# Patient Record
Sex: MALE | Race: WHITE | NOT HISPANIC OR LATINO | Employment: FULL TIME | URBAN - METROPOLITAN AREA
[De-identification: names, ages, dates, MRNs, and addresses within clinical notes are randomized per-mention and may not be internally consistent; named-entity substitution may affect disease eponyms.]

---

## 2022-03-18 ENCOUNTER — OFFICE VISIT (OUTPATIENT)
Dept: FAMILY MEDICINE CLINIC | Facility: CLINIC | Age: 47
End: 2022-03-18

## 2022-03-18 VITALS
DIASTOLIC BLOOD PRESSURE: 82 MMHG | HEIGHT: 74 IN | BODY MASS INDEX: 27.26 KG/M2 | WEIGHT: 212.4 LBS | TEMPERATURE: 98.2 F | HEART RATE: 66 BPM | RESPIRATION RATE: 14 BRPM | OXYGEN SATURATION: 99 % | SYSTOLIC BLOOD PRESSURE: 116 MMHG

## 2022-03-18 DIAGNOSIS — N52.8 OTHER MALE ERECTILE DYSFUNCTION: Primary | ICD-10-CM

## 2022-03-18 DIAGNOSIS — K21.9 GASTROESOPHAGEAL REFLUX DISEASE WITHOUT ESOPHAGITIS: ICD-10-CM

## 2022-03-18 DIAGNOSIS — R79.89 LOW TESTOSTERONE: ICD-10-CM

## 2022-03-18 PROCEDURE — 99214 OFFICE O/P EST MOD 30 MIN: CPT | Performed by: STUDENT IN AN ORGANIZED HEALTH CARE EDUCATION/TRAINING PROGRAM

## 2022-03-18 RX ORDER — TADALAFIL 10 MG/1
5 TABLET ORAL DAILY PRN
Qty: 60 TABLET | Refills: 2 | Status: SHIPPED | OUTPATIENT
Start: 2022-03-18 | End: 2023-01-19 | Stop reason: SDUPTHER

## 2022-03-18 RX ORDER — DOXYCYCLINE HYCLATE 20 MG
TABLET ORAL
COMMUNITY
End: 2022-05-13

## 2022-03-18 NOTE — PROGRESS NOTES
Established Patient Office Visit      Subjective      Chief Complaint:  [facility] (Patient here to est pcp)      History of Present Illness: Dmitri Quispe is a 46 y.o. male who presents to [facility].  He previously saw Dr. Schwartz as [facility] who was previously prescribing his testosterone.  He has previously had a colonoscopy at [facility] and will request these records.    Reviewed urgent care note from 3/16.  He had some chest wall discomfort and epigastric pain and was started on omeprazole.    Paitent had one episode when he drank coffee and then had a discomfort at about he level of xyphoid.     Patient was on testosterone 100 mg weekly.  He has not taken testosterone in about 4 months.    Past Medical History:   Diagnosis Date   • Arthritis    • Gastroesophageal reflux disease without esophagitis    • Heartburn    • Low testosterone    • Other male erectile dysfunction        Patient Active Problem List   Diagnosis   • Low testosterone   • Other male erectile dysfunction   • Gastroesophageal reflux disease without esophagitis         Current Outpatient Medications:   •  Benzoyl Peroxide 10 % liquid, benzoyl peroxide 10 % topical cleanser  Wash onto the affected areas of acne on the back, chest, shoulders and rinse daily, as needed., Disp: , Rfl:   •  cholecalciferol (VITAMIN D3) 25 MCG (1000 UT) tablet, Take 1,000 Units by mouth Daily., Disp: , Rfl:   •  doxycycline (PERIOSTAT) 20 MG tablet, doxycycline hyclate 20 mg tablet  Take 1 tablet twice a day, as needed, for acne breakouts., Disp: , Rfl:   •  omeprazole (priLOSEC) 20 MG capsule, Take 1 capsule by mouth 2 (Two) Times a Day., Disp: 60 capsule, Rfl: 0  •  VITAMIN K PO, Take  by mouth., Disp: , Rfl:   •  tadalafil (Cialis) 10 MG tablet, Take 0.5 tablets by mouth Daily As Needed for Erectile Dysfunction., Disp: 60 tablet, Rfl: 2  •  Testosterone Cypionate 200 MG/ML kit, testosterone, Disp: , Rfl:       Objective     Physical  "Exam:   Vital Signs:   /82 (BP Location: Left arm, Patient Position: Sitting, Cuff Size: Adult)   Pulse 66   Temp 98.2 °F (36.8 °C) (Temporal)   Resp 14   Ht 188 cm (74\")   Wt 96.3 kg (212 lb 6.4 oz)   SpO2 99%   BMI 27.27 kg/m²      Physical Exam  Constitutional:       General: He is not in acute distress.     Appearance: He is not ill-appearing.   Cardiovascular:      Rate and Rhythm: Normal rate and regular rhythm.  No murmur  Pulmonary:      Effort: Pulmonary effort is normal.      Breath sounds: Normal breath sounds.   Neurological:      Mental Status: He is alert.   Psychiatric:         Thought Content: Thought content normal.          Assessment / Plan      Assessment/Plan:   Diagnoses and all orders for this visit:    1. Other male erectile dysfunction (Primary)  -     tadalafil (Cialis) 10 MG tablet; Take 0.5 tablets by mouth Daily As Needed for Erectile Dysfunction.  Dispense: 60 tablet; Refill: 2    2. Low testosterone        -     We will await labs from [facility].  We had a discussion about possible treatment and treatment options and plan moving forward for low testosterone.  Will discuss further follow-up appointment.       3. Gastroesophageal reflux disease without esophagitis  -Short episode of epigastric pain that seems like could been related to GERD.  -Reasonable to continue trial of omeprazole 20 mg twice daily for 2 months.  GERD diet given.    Follow Up:   Return in about 6 weeks (around 4/29/2022) for Wellness visit.      MDM: Multiple chronic problems, prescription drug management    Matteo Constantino MD  [facility]    Addendum 3/28/2022  Review of records shows testosterone of just above 200.  LH within normal limits.  CBC within normal limits.  PSA 0.9.    Matteo Constantino MD  [facility]      "

## 2022-03-18 NOTE — PATIENT INSTRUCTIONS
Sign records. [facility] colonoscopy.     Bring last labs.     Sign records from Dr. Schwartz ([facility]) last 2 years of records

## 2022-03-19 PROBLEM — K21.9 GASTROESOPHAGEAL REFLUX DISEASE WITHOUT ESOPHAGITIS: Status: ACTIVE | Noted: 2022-03-19

## 2022-04-01 ENCOUNTER — OFFICE VISIT (OUTPATIENT)
Dept: FAMILY MEDICINE CLINIC | Facility: CLINIC | Age: 47
End: 2022-04-01

## 2022-04-01 VITALS
HEART RATE: 58 BPM | OXYGEN SATURATION: 97 % | HEIGHT: 74 IN | BODY MASS INDEX: 27.08 KG/M2 | WEIGHT: 211 LBS | TEMPERATURE: 98.6 F | DIASTOLIC BLOOD PRESSURE: 74 MMHG | SYSTOLIC BLOOD PRESSURE: 118 MMHG

## 2022-04-01 DIAGNOSIS — E29.1 HYPOGONADISM IN MALE: ICD-10-CM

## 2022-04-01 DIAGNOSIS — N52.8 OTHER MALE ERECTILE DYSFUNCTION: ICD-10-CM

## 2022-04-01 DIAGNOSIS — K63.5 SERRATED POLYP OF COLON: ICD-10-CM

## 2022-04-01 DIAGNOSIS — Z12.11 SCREENING FOR COLON CANCER: ICD-10-CM

## 2022-04-01 PROCEDURE — 99214 OFFICE O/P EST MOD 30 MIN: CPT | Performed by: STUDENT IN AN ORGANIZED HEALTH CARE EDUCATION/TRAINING PROGRAM

## 2022-04-01 RX ORDER — TESTOSTERONE GEL, 1% 10 MG/G
50 GEL TRANSDERMAL DAILY
Qty: 150 G | Refills: 1 | Status: SHIPPED | OUTPATIENT
Start: 2022-04-01 | End: 2022-04-06

## 2022-04-01 NOTE — PROGRESS NOTES
"  Established Patient Office Visit      Subjective      Chief Complaint:  testosterone (Low level )      History of Present Illness: Dmitri Quispe is a 46 y.o. male who presents for low testosterone and decreased libido and erectile dysfunction    On scanned labs testosterone with morning blood draw was 203.  LH was 2.7.  TSH was 2.5.  PSA was 0.9.  CBC within normal limits.  Renal panel within normal limits.    Patient has decreased libido and erectile dysfunction.  He was previously on injectable therapy with Dr. Schwartz.    No family history of prostate cancer.    Past Medical History:   Diagnosis Date   • Arthritis    • Gastroesophageal reflux disease without esophagitis    • Heartburn    • Low testosterone    • Other male erectile dysfunction        Patient Active Problem List   Diagnosis   • Low testosterone   • Other male erectile dysfunction   • Gastroesophageal reflux disease without esophagitis   • Serrated polyp of colon         Current Outpatient Medications:   •  cholecalciferol (VITAMIN D3) 25 MCG (1000 UT) tablet, Take 1,000 Units by mouth Daily., Disp: , Rfl:   •  doxycycline (PERIOSTAT) 20 MG tablet, doxycycline hyclate 20 mg tablet  Take 1 tablet twice a day, as needed, for acne breakouts., Disp: , Rfl:   •  omeprazole (priLOSEC) 20 MG capsule, Take 1 capsule by mouth 2 (Two) Times a Day., Disp: 60 capsule, Rfl: 0  •  tadalafil (Cialis) 10 MG tablet, Take 0.5 tablets by mouth Daily As Needed for Erectile Dysfunction., Disp: 60 tablet, Rfl: 2  •  VITAMIN K PO, Take  by mouth., Disp: , Rfl:   •  testosterone (AndroGel) 50 MG/5GM (1%) gel gel, Place 50 mg on the skin as directed by provider Daily. Apply once daily., Disp: 150 g, Rfl: 1      Objective     Physical Exam:   Vital Signs:   /74   Pulse 58   Temp 98.6 °F (37 °C)   Ht 188 cm (74\")   Wt 95.7 kg (211 lb)   SpO2 97%   BMI 27.09 kg/m²      Physical Exam  Constitutional:       General: He is not in acute distress.     Appearance: He " is not ill-appearing.   Cardiovascular:      Rate and Rhythm: Normal rate and regular rhythm.   Pulmonary:      Effort: Pulmonary effort is normal.      Breath sounds: Normal breath sounds.   Neurological:      Mental Status: He is alert.   Psychiatric:         Thought Content: Thought content normal.          Assessment / Plan      Assessment/Plan:   Diagnoses and all orders for this visit:    1. Hypogonadism in male  -     testosterone (AndroGel) 50 MG/5GM (1%) gel gel; Place 50 mg on the skin as directed by provider Daily. Apply once daily.  Dispense: 150 g; Refill: 1    2. Other male erectile dysfunction    3. Screening for colon cancer  -     Ambulatory Referral For Screening Colonoscopy    4. Serrated polyp of colon      Start AndroGel 50 mg once daily.  Discussed risk of transferring medication onto other skin.  Handout on application given. Discussed risks of irritation to skin.  Discussed that he will need to come in minimum every 3 months for refills.  Follow-up in 6 weeks for testosterone check.  Repeat labs at 3-month jacob.    Discussed potential increased risk of prostate cancer, BPH, erythrocytosis, VTE, potential cardiovascular risk, decreased sperm production and potential future infertility. (he has vasectomy)    Continue Cialis as needed for erectile dysfunction    Refer back to GI at [facility] for repeat colonoscopy as he had serrated polyp on initial colonoscopy.  He is slightly overdue for repeat colonoscopy.  We discussed the increased risk of this polyp type of pathology.    Follow Up:   Return in about 6 weeks (around 5/13/2022).      MDM:     Matteo Constantino MD  Family Medicine - [facility] [place]

## 2022-04-01 NOTE — PATIENT INSTRUCTIONS
Method of Using AndroGel  AndroGel should be applied on the skin and only in the prescribed doses. One should not increase the dose of AndroGel without recommendations from the healthcare provider.    AndroGel Pumps  You will need to prime the AndroGel pump before the first use. To do so, fully push down on the pump three times and discard the content that comes out during priming. After priming, the pump is ready for use. Press the pump to release the AndroGel on the palm of your hands. Dose will be determined by the number of presses.    AndroGel Packets    Simply tear open the packets and squeeze the content on the palm of your hand.    Once the AndroGel is there on palm of your hand, apply it to a clean, dry and intact area of skin. Wash your hands thoroughly with soap and water immediately after application of the AndroGel. You should let the area dry (where AndroGel is applied) before wearing any clothes and should not shower or swim for at least 5 hours after the application of the AndroGel.    AndroGel is flammable when wet, so you should wait for it to dry before smoking or going near a fire. AndroGel should be applied at the same time each day. You should apply AndroGel only to skin areas that will be covered by a short sleeve t-shirt (upper arms, stomach area, or shoulders). AndroGel should never be applied to penis or scrotum. Before making skin-to-skin contact with another person, the applied area should be washed with soap and water.

## 2022-04-05 ENCOUNTER — TELEPHONE (OUTPATIENT)
Dept: FAMILY MEDICINE CLINIC | Facility: CLINIC | Age: 47
End: 2022-04-05

## 2022-04-06 ENCOUNTER — PRIOR AUTHORIZATION (OUTPATIENT)
Dept: FAMILY MEDICINE CLINIC | Facility: CLINIC | Age: 47
End: 2022-04-06

## 2022-04-06 ENCOUNTER — TELEPHONE (OUTPATIENT)
Dept: FAMILY MEDICINE CLINIC | Facility: CLINIC | Age: 47
End: 2022-04-06

## 2022-04-06 DIAGNOSIS — E29.1 HYPOGONADISM IN MALE: Primary | ICD-10-CM

## 2022-04-06 DIAGNOSIS — E29.1 HYPOGONADISM IN MALE: ICD-10-CM

## 2022-04-06 RX ORDER — TESTOSTERONE CYPIONATE 200 MG/ML
150 INJECTION, SOLUTION INTRAMUSCULAR
Status: DISCONTINUED | OUTPATIENT
Start: 2022-04-06 | End: 2022-05-13

## 2022-04-06 RX ORDER — TESTOSTERONE 16.2 MG/G
GEL TRANSDERMAL
Qty: 75 G | Refills: 2 | Status: SHIPPED | OUTPATIENT
Start: 2022-04-06 | End: 2022-05-13

## 2022-04-06 NOTE — TELEPHONE ENCOUNTER
Please call patient let him know I prescribed the testosterone injectable form.    He can come in at his convenience to get his first injection and bring his medication.    We can inject for him or if he feels comfortable after adequate teaching about quadricep intramuscular injection he could choose to do this every 2 weeks at home.      Please describe to him that he will need to get his testosterone tested approximately 7 days after administration of one of his doses to check the testosterone levels.  This is the accurate and appropriate time to check testosterone level.    He can come in to get this lab test a week for follow-up or he can reschedule his appointment to land on this date for himself.

## 2022-04-06 NOTE — TELEPHONE ENCOUNTER
I called patient and explained that the injectable testosterone was sent to pharmacy per provider today. I instructed that he could come in today for a nurse visit if he would like this afternoon after picking this up at pharmacy and then we could teach or reassure that he knows how to give this to himself so that he could administer his quad muscle every two weeks but after the first injection he will need to come in 7 days to get this re-measured, he verbalized understanding and appreciation.

## 2022-04-08 NOTE — TELEPHONE ENCOUNTER
We tried to do the prior auth but it was denied over the phone, patient wanted to pay for the gel out of pocket with a coupon he found so no further prior auth needed.

## 2022-05-13 ENCOUNTER — OFFICE VISIT (OUTPATIENT)
Dept: FAMILY MEDICINE CLINIC | Facility: CLINIC | Age: 47
End: 2022-05-13

## 2022-05-13 VITALS
RESPIRATION RATE: 12 BRPM | SYSTOLIC BLOOD PRESSURE: 106 MMHG | BODY MASS INDEX: 26.11 KG/M2 | HEIGHT: 75 IN | WEIGHT: 210 LBS | HEART RATE: 82 BPM | OXYGEN SATURATION: 98 % | TEMPERATURE: 98 F | DIASTOLIC BLOOD PRESSURE: 80 MMHG

## 2022-05-13 DIAGNOSIS — E29.1 HYPOGONADISM IN MALE: Primary | ICD-10-CM

## 2022-05-13 PROCEDURE — 99213 OFFICE O/P EST LOW 20 MIN: CPT | Performed by: STUDENT IN AN ORGANIZED HEALTH CARE EDUCATION/TRAINING PROGRAM

## 2022-05-13 RX ORDER — TESTOSTERONE CYPIONATE 200 MG/ML
100 INJECTION, SOLUTION INTRAMUSCULAR
Qty: 4 ML | Refills: 0 | Status: SHIPPED | OUTPATIENT
Start: 2022-05-13 | End: 2022-05-17 | Stop reason: SDUPTHER

## 2022-05-13 RX ORDER — SYRINGE W-NEEDLE,DISPOSAB,3 ML 25GX5/8"
1 SYRINGE, EMPTY DISPOSABLE MISCELLANEOUS WEEKLY
Qty: 13 EACH | Refills: 3 | Status: SHIPPED | OUTPATIENT
Start: 2022-05-13 | End: 2022-08-17 | Stop reason: SDUPTHER

## 2022-05-13 RX ORDER — TESTOSTERONE CYPIONATE 200 MG/ML
100 INJECTION, SOLUTION INTRAMUSCULAR WEEKLY
Status: DISCONTINUED | OUTPATIENT
Start: 2022-05-13 | End: 2022-08-17

## 2022-05-13 NOTE — PROGRESS NOTES
"  Established Patient Office Visit      Subjective      Chief Complaint:  Hypogonadism      History of Present Illness: Dmitri Quispe is a 46 y.o. male who presents for follow-up of hypogonadism.    He is tolerating topical treatment well without problems.  He denies headache shortness of breath or chest pain.  He feels well.    He request change to once weekly injection.  Past Medical History:   Diagnosis Date   • Arthritis    • Gastroesophageal reflux disease without esophagitis    • Heartburn    • Low testosterone    • Other male erectile dysfunction        Patient Active Problem List   Diagnosis   • Low testosterone   • Other male erectile dysfunction   • Gastroesophageal reflux disease without esophagitis   • Serrated polyp of colon         Current Outpatient Medications:   •  cholecalciferol (VITAMIN D3) 25 MCG (1000 UT) tablet, Take 1,000 Units by mouth Daily., Disp: , Rfl:   •  tadalafil (Cialis) 10 MG tablet, Take 0.5 tablets by mouth Daily As Needed for Erectile Dysfunction., Disp: 60 tablet, Rfl: 2  •  VITAMIN K PO, Take  by mouth., Disp: , Rfl:   •  doxycycline (PERIOSTAT) 20 MG tablet, doxycycline hyclate 20 mg tablet  Take 1 tablet twice a day, as needed, for acne breakouts., Disp: , Rfl:   •  omeprazole (priLOSEC) 20 MG capsule, Take 1 capsule by mouth 2 (Two) Times a Day., Disp: 60 capsule, Rfl: 0  •  Syringe 23G X 1\" 3 ML misc, Inject 1 each into the appropriate muscle as directed by prescriber 1 (One) Time Per Week., Disp: 13 each, Rfl: 3  •  Testosterone Cypionate (Depo-Testosterone) 200 MG/ML injection, Inject 0.5 mL into the appropriate muscle as directed by prescriber Every 7 (Seven) Days., Disp: 4 mL, Rfl: 0    Current Facility-Administered Medications:   •  Testosterone Cypionate (DEPOTESTOTERONE CYPIONATE) injection 100 mg, 100 mg, Intramuscular, Weekly, Matteo Constantino MD      Objective     Physical Exam:   Vital Signs:   /80   Pulse 82   Temp 98 °F (36.7 °C)   Resp 12   Ht 190.5 " "cm (75\")   Wt 95.3 kg (210 lb)   SpO2 98%   BMI 26.25 kg/m²      Physical Exam  Constitutional:       General: He is not in acute distress.     Appearance: He is not ill-appearing.   Cardiovascular:      Rate and Rhythm: Normal rate and regular rhythm.  No murmur  Pulmonary:      Effort: Pulmonary effort is normal.      Breath sounds: Normal breath sounds.   Neurological:      Mental Status: He is alert.   Psychiatric:         Thought Content: Thought content normal.          Assessment / Plan      Assessment/Plan:   Diagnoses and all orders for this visit:    1. Hypogonadism in male (Primary)  -     Testosterone Cypionate (Depo-Testosterone) 200 MG/ML injection; Inject 0.5 mL into the appropriate muscle as directed by prescriber Every 7 (Seven) Days.  Dispense: 4 mL; Refill: 0  -     Syringe 23G X 1\" 3 ML misc; Inject 1 each into the appropriate muscle as directed by prescriber 1 (One) Time Per Week.  Dispense: 13 each; Refill: 3  -     Testosterone Cypionate (DEPOTESTOTERONE CYPIONATE) injection 100 mg    Switch to testosterone injection 100 mg weekly.  He will come in for an instructions on self injection and then perform injection himself after initial visit.    Discussed potential increased risk of prostate cancer, BPH, erythrocytosis, VTE, potential cardiovascular risk, decreased sperm production and potential future infertility with testosterone treatment.  Patient states understanding.    He was instructed to get labs in 6 weeks 3 to 4 days after his injection and follow-up in 2 months for office visit.    Follow Up:   No follow-ups on file.      MDM:     Matteo Constantino MD  Family Medicine - [facility]  "

## 2022-05-16 DIAGNOSIS — E29.1 HYPOGONADISM IN MALE: ICD-10-CM

## 2022-05-17 RX ORDER — TESTOSTERONE CYPIONATE 200 MG/ML
100 INJECTION, SOLUTION INTRAMUSCULAR
Qty: 4 ML | Refills: 0 | Status: SHIPPED | OUTPATIENT
Start: 2022-05-17 | End: 2022-08-17 | Stop reason: SDUPTHER

## 2022-07-18 ENCOUNTER — PATIENT MESSAGE (OUTPATIENT)
Dept: FAMILY MEDICINE CLINIC | Facility: CLINIC | Age: 47
End: 2022-07-18

## 2022-07-18 DIAGNOSIS — E29.1 HYPOGONADISM IN MALE: ICD-10-CM

## 2022-08-17 RX ORDER — TESTOSTERONE CYPIONATE 200 MG/ML
100 INJECTION, SOLUTION INTRAMUSCULAR
Qty: 6 ML | Refills: 0 | Status: SHIPPED | OUTPATIENT
Start: 2022-08-17 | End: 2022-09-30 | Stop reason: SDUPTHER

## 2022-08-17 RX ORDER — SYRINGE W-NEEDLE,DISPOSAB,3 ML 25GX5/8"
1 SYRINGE, EMPTY DISPOSABLE MISCELLANEOUS WEEKLY
Qty: 12 EACH | Refills: 1 | Status: SHIPPED | OUTPATIENT
Start: 2022-08-17 | End: 2022-09-30 | Stop reason: SDUPTHER

## 2022-09-03 DIAGNOSIS — E29.1 HYPOGONADISM IN MALE: ICD-10-CM

## 2022-09-07 RX ORDER — TESTOSTERONE CYPIONATE 200 MG/ML
INJECTION, SOLUTION INTRAMUSCULAR
Qty: 4 ML | Refills: 0 | OUTPATIENT
Start: 2022-09-07

## 2022-09-29 ENCOUNTER — TELEPHONE (OUTPATIENT)
Dept: FAMILY MEDICINE CLINIC | Facility: CLINIC | Age: 47
End: 2022-09-29

## 2022-09-29 NOTE — TELEPHONE ENCOUNTER
Caller: Dmitri Quispe    Relationship: Self    Best call back number: [phone]    What medication are you requesting: Doxycycline    What are your current symptoms: PAINFUL LITTLE HARD CYSTS ON THE BACK OF NECK ALONG HAIR LINE    How long have you been experiencing symptoms: A COUPLE OF MONTHS    Have you had these symptoms before:    [x] Yes  [] No    Have you been treated for these symptoms before:   [x] Yes  [] No    If a prescription is needed, what is your preferred pharmacy and phone number: [id number] - [place], [place] - [address] [place] [address] - [phone]  - [phone] FX     Additional notes: PATIENT HAS HAD THIS ISSUE BEFORE AND WOULD LIKE SOMETHING CALLED IN TO HELP.

## 2022-09-30 ENCOUNTER — OFFICE VISIT (OUTPATIENT)
Dept: FAMILY MEDICINE CLINIC | Facility: CLINIC | Age: 47
End: 2022-09-30

## 2022-09-30 VITALS
TEMPERATURE: 99.8 F | RESPIRATION RATE: 21 BRPM | SYSTOLIC BLOOD PRESSURE: 110 MMHG | BODY MASS INDEX: 25.54 KG/M2 | DIASTOLIC BLOOD PRESSURE: 76 MMHG | HEIGHT: 75 IN | HEART RATE: 65 BPM | WEIGHT: 205.4 LBS | OXYGEN SATURATION: 99 %

## 2022-09-30 DIAGNOSIS — L02.92 FURUNCLE: Primary | ICD-10-CM

## 2022-09-30 DIAGNOSIS — R79.89 LOW TESTOSTERONE: ICD-10-CM

## 2022-09-30 DIAGNOSIS — L70.9 ACNE, UNSPECIFIED ACNE TYPE: ICD-10-CM

## 2022-09-30 DIAGNOSIS — E29.1 HYPOGONADISM IN MALE: ICD-10-CM

## 2022-09-30 PROCEDURE — 99214 OFFICE O/P EST MOD 30 MIN: CPT | Performed by: STUDENT IN AN ORGANIZED HEALTH CARE EDUCATION/TRAINING PROGRAM

## 2022-09-30 RX ORDER — TESTOSTERONE CYPIONATE 200 MG/ML
100 INJECTION, SOLUTION INTRAMUSCULAR
Qty: 6 ML | Refills: 0 | Status: SHIPPED | OUTPATIENT
Start: 2022-09-30 | End: 2022-12-15

## 2022-09-30 RX ORDER — DOXYCYCLINE HYCLATE 100 MG/1
100 CAPSULE ORAL 2 TIMES DAILY
Qty: 20 CAPSULE | Refills: 0 | Status: SHIPPED | OUTPATIENT
Start: 2022-09-30

## 2022-09-30 RX ORDER — DOXYCYCLINE HYCLATE 100 MG/1
100 CAPSULE ORAL 2 TIMES DAILY
Qty: 20 CAPSULE | Refills: 0 | Status: SHIPPED | OUTPATIENT
Start: 2022-09-30 | End: 2022-09-30

## 2022-09-30 RX ORDER — SYRINGE W-NEEDLE,DISPOSAB,3 ML 25GX5/8"
1 SYRINGE, EMPTY DISPOSABLE MISCELLANEOUS WEEKLY
Qty: 12 EACH | Refills: 1 | Status: SHIPPED | OUTPATIENT
Start: 2022-09-30

## 2022-09-30 NOTE — PROGRESS NOTES
"  Established Patient Office Visit        Subjective      Chief Complaint:  Cyst (Pt states that he has noticed a hard painful cyst on his neck under his hairline. )      History of Present Illness: Dmitri Quispe is a 47 y.o. male who presents for cyst to the back of the hairline.  He is applying adapalene to this area.    Couple areas of acne to the chin.  Is a history of nodulocystic acne as a teenager.  And did well with Accutane.    Labs reviewed from 7/26 shows normal hematocrit, normal PSA, total testosterone of 659.    Patient Active Problem List   Diagnosis   • Low testosterone   • Other male erectile dysfunction   • Gastroesophageal reflux disease without esophagitis   • Serrated polyp of colon         Current Outpatient Medications:   •  cholecalciferol (VITAMIN D3) 25 MCG (1000 UT) tablet, Take 1,000 Units by mouth Daily., Disp: , Rfl:   •  doxycycline (VIBRAMYCIN) 100 MG capsule, Take 1 capsule by mouth 2 (Two) Times a Day., Disp: 20 capsule, Rfl: 0  •  Syringe 23G X 1\" 3 ML misc, Inject 1 each into the appropriate muscle as directed by prescriber 1 (One) Time Per Week., Disp: 12 each, Rfl: 1  •  tadalafil (Cialis) 10 MG tablet, Take 0.5 tablets by mouth Daily As Needed for Erectile Dysfunction., Disp: 60 tablet, Rfl: 2  •  Testosterone Cypionate (Depo-Testosterone) 200 MG/ML injection, Inject 0.5 mL into the appropriate muscle as directed by prescriber Every 7 (Seven) Days., Disp: 6 mL, Rfl: 0  •  VITAMIN K PO, Take  by mouth., Disp: , Rfl:   •  triamcinolone (KENALOG) 0.1 % ointment, Apply 1 application topically to the appropriate area as directed 2 (Two) Times a Day., Disp: 30 g, Rfl: 1       Objective     Physical Exam:   Vital Signs:   /76   Pulse 65   Temp 99.8 °F (37.7 °C) (Temporal)   Resp 21   Ht 190.5 cm (75\")   Wt 93.2 kg (205 lb 6.4 oz)   SpO2 99%   BMI 25.67 kg/m²      Physical Exam  Constitutional:       General: He is not in acute distress.     Appearance: He is not " ill-appearing.   Cardiovascular:      Rate and Rhythm: Normal rate and regular rhythm.   Pulmonary:      Effort: Pulmonary effort is normal.      Breath sounds: Normal breath sounds.   Neurological:      Mental Status: He is alert.   Psychiatric:         Thought Content: Thought content normal.   Cysts/furuncle to left lower hairline and one cyst less furuncle to the lower posterior scalp    3 areas of erythema to the chin         Assessment / Plan      Assessment/Plan:   Diagnoses and all orders for this visit:    1. Furuncle (Primary)  -Cyst/furuncles to the hairline to the posterior scalp  -     triamcinolone (KENALOG) 0.1 % ointment; Apply 1 application topically to the appropriate area as directed 2 (Two) Times a Day.  Dispense: 30 g; Refill: 1  -     Discontinue: doxycycline (VIBRAMYCIN) 100 MG capsule; Take 1 capsule by mouth 2 (Two) Times a Day.  Dispense: 20 capsule; Refill: 0  -     doxycycline (VIBRAMYCIN) 100 MG capsule; Take 1 capsule by mouth 2 (Two) Times a Day.  Dispense: 20 capsule; Refill: 0  -     Ambulatory Referral to Dermatology  Nicci Dey to decrease the adapalene every other day given some skin irritation at this time.    2. Low testosterone  -Total testosterone on 7/20 6/2022 showed total testosterone of 659 but we have not been mid injection so we will continue that testosterone 100 mg once weekly.  -Recheck testosterone level before next visit and adjust as needed  -Duglas appropriate last fill was June.    3. Acne, unspecified acne type  Benzyl peroxide as needed    Orders Placed This Encounter   Procedures   • Comprehensive Metabolic Panel     Standing Status:   Future     Standing Expiration Date:   9/30/2023     Order Specific Question:   Release to patient     Answer:   Routine Release   • Testosterone     Standing Status:   Future     Standing Expiration Date:   9/30/2023     Order Specific Question:   Release to patient     Answer:   Routine Release   • Ambulatory Referral to  Dermatology     Referral Priority:   Routine     Referral Type:   Consultation     Referral Reason:   Specialty Services Required     Requested Specialty:   Dermatology     Number of Visits Requested:   1   • CBC & Differential     Standing Status:   Future     Standing Expiration Date:   9/30/2023     Order Specific Question:   Manual Differential     Answer:   No         Follow Up:   Return in about 11 weeks (around 12/16/2022) for Follow-up.      MDM: Labs, prescription drug management    Matteo Constantino MD  Family Medicine - [facility]

## 2022-10-19 ENCOUNTER — PRIOR AUTHORIZATION (OUTPATIENT)
Dept: FAMILY MEDICINE CLINIC | Facility: CLINIC | Age: 47
End: 2022-10-19

## 2022-11-22 ENCOUNTER — E-VISIT (OUTPATIENT)
Dept: ADMINISTRATIVE | Facility: OTHER | Age: 47
End: 2022-11-22

## 2022-11-22 NOTE — E-VISIT ESCALATED
Chief Complaint: Coronavirus (COVID-19), cold, sinus pain, allergy, or flu   Patient was shown the following escalation message:   You do not need treatment at this time   Since you're not having any of the symptoms treated here, this interview may not be the best way for you to get care.   If you develop symptoms, take a new interview or speak with a provider.   For information about COVID-19, see www.cdc.gov/coronavirus/2019-ncov/your-health/about-covid-19.html  .   If you want to get the COVID-19 vaccine, see www.vaccines.gov  .   If you think you're having a skin reaction to a recent COVID-19 vaccine, please start over and try getting care for a rash.   ----------   Patient Interview Transcript:   Please carefully consider each question and answer as best you can. This helps your provider give you the best care. Which of these symptoms are bothering you? Select all that apply.    I don't have any of these symptoms   Not selected:    Cough    Shortness of breath    Fever    Stuffed-up nose or sinuses    Runny nose    Itchy or watery eyes    Itchy nose or sneezing    Loss of smell or taste    Sore throat    Hoarse voice or loss of voice    Headache    Sweats    Chills    Muscle or body aches    Fatigue or tiredness    Nausea or vomiting    Diarrhea   ----------   Medical history   Medical history data does not currently exist for this patient.

## 2022-12-15 ENCOUNTER — OFFICE VISIT (OUTPATIENT)
Dept: FAMILY MEDICINE CLINIC | Facility: CLINIC | Age: 47
End: 2022-12-15

## 2022-12-15 ENCOUNTER — TELEPHONE (OUTPATIENT)
Dept: FAMILY MEDICINE CLINIC | Facility: CLINIC | Age: 47
End: 2022-12-15

## 2022-12-15 VITALS
HEIGHT: 75 IN | HEART RATE: 62 BPM | SYSTOLIC BLOOD PRESSURE: 120 MMHG | DIASTOLIC BLOOD PRESSURE: 82 MMHG | RESPIRATION RATE: 16 BRPM | TEMPERATURE: 98.4 F | OXYGEN SATURATION: 97 % | BODY MASS INDEX: 25.31 KG/M2 | WEIGHT: 203.6 LBS

## 2022-12-15 DIAGNOSIS — E29.1 HYPOGONADISM IN MALE: ICD-10-CM

## 2022-12-15 PROCEDURE — 99214 OFFICE O/P EST MOD 30 MIN: CPT | Performed by: STUDENT IN AN ORGANIZED HEALTH CARE EDUCATION/TRAINING PROGRAM

## 2022-12-15 RX ORDER — CLINDAMYCIN PHOSPHATE 10 UG/ML
LOTION TOPICAL
COMMUNITY
Start: 2022-11-30

## 2022-12-15 RX ORDER — TESTOSTERONE 16.2 MG/G
GEL TRANSDERMAL
Qty: 75 G | Refills: 2 | Status: SHIPPED | OUTPATIENT
Start: 2022-12-15 | End: 2022-12-15

## 2022-12-15 NOTE — TELEPHONE ENCOUNTER
Please call patient.      After review of prescription refills your not yet due for refill.  You should have some testosterone supply left.    Please come to the office and show us your unused prescription vials with your prescription on this.    If you are unable to do this I will not be able to refill the testosterone.    Matteo Constantino MD  Family Medicine - [facility]

## 2022-12-15 NOTE — PROGRESS NOTES
"Answers for HPI/ROS submitted by the patient on 12/8/2022  What is the primary reason for your visit?: Other  Please describe your symptoms.: Bloodwork  Have you had these symptoms before?: No  How long have you been having these symptoms?: 1-4 days      Established Patient Office Visit        Subjective      Chief Complaint:  Results (Follow up on hypogonadism)      History of Present Illness: Dmitri Quispe is a 47 y.o. male who presents for follow-up of hypogonadism.    I reviewed his CMP and CBC and they are within acceptable limits.  They have been scanned.  He has been off the testosterone injections for a couple weeks but he said recently used some testosterone gel.    He is recently been told that his wife wants a divorce which has been difficult for him.    Patient Active Problem List   Diagnosis   • Low testosterone   • Other male erectile dysfunction   • Gastroesophageal reflux disease without esophagitis   • Serrated polyp of colon         Current Outpatient Medications:   •  doxycycline (VIBRAMYCIN) 100 MG capsule, Take 1 capsule by mouth 2 (Two) Times a Day., Disp: 20 capsule, Rfl: 0  •  Syringe 23G X 1\" 3 ML misc, Inject 1 each into the appropriate muscle as directed by prescriber 1 (One) Time Per Week., Disp: 12 each, Rfl: 1  •  tadalafil (Cialis) 10 MG tablet, Take 0.5 tablets by mouth Daily As Needed for Erectile Dysfunction., Disp: 60 tablet, Rfl: 2  •  triamcinolone (KENALOG) 0.1 % ointment, Apply 1 application topically to the appropriate area as directed 2 (Two) Times a Day., Disp: 30 g, Rfl: 1  •  cholecalciferol (VITAMIN D3) 25 MCG (1000 UT) tablet, Take 1,000 Units by mouth Daily., Disp: , Rfl:   •  clindamycin (CLEOCIN T) 1 % lotion, , Disp: , Rfl:   •  tretinoin (RETIN-A) 0.025 % cream, , Disp: , Rfl:        Objective     Physical Exam:   Vital Signs:   /82 (BP Location: Left arm, Patient Position: Sitting, Cuff Size: Adult)   Pulse 62   Temp 98.4 °F (36.9 °C) (Temporal)   Resp 16 " "  Ht 190.5 cm (75\")   Wt 92.4 kg (203 lb 9.6 oz)   SpO2 97%   BMI 25.45 kg/m²      Physical Exam  Constitutional:       General: He is not in acute distress.     Appearance: He is not ill-appearing.   Cardiovascular:      Rate and Rhythm: Normal rate and regular rhythm.   Pulmonary:      Effort: Pulmonary effort is normal.      Breath sounds: Normal breath sounds.   Neurological:      Mental Status: He is alert.   Psychiatric:         Thought Content: Thought content normal.            Assessment / Plan      Assessment/Plan:   Diagnoses and all orders for this visit:    1. Hypogonadism in male  -     Discontinue: Testosterone 20.25 MG/ACT (1.62%) gel; Apply 40.5 mg once daily to skin on upper arms/shoulder  Dispense: 75 g; Refill: 2  -     PSA DIAGNOSTIC ONLY; Future      After patient left office further review was done of his history of testosterone prescriptions.  He has been intermittently using testosterone and not consistent.  He has an early refill of his testosterone gel back in April.  He is actually not due for a testosterone refill yet.  I called the pharmacist and spoke to them and canceled his testosterone prescription for now until he can show us that he still has his testosterone vials.  If this is the case we can refill the testosterone gel.    Labs from November occupational health scanned and reassuring.    Repeat labs prior to office visit in 3 months.    Given recent there is a possible divorce I did recommend psychotherapy.    Follow Up:   Return in about 3 months (around 3/15/2023).      MDM: I spent 32 minutes caring for Dmitri on this date of service. This time includes time spent by me in the following activities: preparing for the visit, performing a medically appropriate examination and/or evaluation, counseling and educating the patient/family/caregiver and documenting information in the medical record.  Additional time spent today reviewing [facility] record and reviewing the patient's " prescriptions with the pharmacist on the phone.      Matteo Constantino MD  Family Medicine - [facility] [place]

## 2022-12-19 NOTE — TELEPHONE ENCOUNTER
Please call the patient as he did not respond to his [employer] message.    Given that your prescription should have lasted until this Wednesday and you have not been using your injectable testosterone it will be required that you come show us unused vials with your prescription on them for any further testosterone refills for testosterone gel.    (Note to staff.  If he brings these in please ensure his name is on them and tell me how many are left.  This is to ensure appropriate use of medication.)    Matteo Constantino MD  Family Medicine - [facility] [place]

## 2022-12-20 NOTE — TELEPHONE ENCOUNTER
"I called patient and made him aware of this. When he comes to show vials to staff please confirm per Dr. Constantino that vials have patient's name on them and an appropriate amount of medication in vials ie if vial says '1mL\" that it looks like it could be 1 mL.  "

## 2023-01-19 DIAGNOSIS — N52.8 OTHER MALE ERECTILE DYSFUNCTION: ICD-10-CM

## 2023-01-19 RX ORDER — TADALAFIL 10 MG/1
5 TABLET ORAL DAILY PRN
Qty: 60 TABLET | Refills: 2 | Status: SHIPPED | OUTPATIENT
Start: 2023-01-19

## 2023-03-06 DIAGNOSIS — E29.1 HYPOGONADISM IN MALE: ICD-10-CM

## 2023-03-06 DIAGNOSIS — L02.92 FURUNCLE: ICD-10-CM

## 2023-03-06 RX ORDER — DOXYCYCLINE HYCLATE 100 MG/1
CAPSULE ORAL
Qty: 20 CAPSULE | Refills: 0 | OUTPATIENT
Start: 2023-03-06

## 2023-03-06 RX ORDER — TESTOSTERONE CYPIONATE 200 MG/ML
INJECTION, SOLUTION INTRAMUSCULAR
Qty: 6 ML | OUTPATIENT
Start: 2023-03-06

## 2023-03-06 NOTE — TELEPHONE ENCOUNTER
Rx Refill Note  Requested Prescriptions     Pending Prescriptions Disp Refills   • doxycycline (VIBRAMYCIN) 100 MG capsule [Pharmacy Med Name: DOXYCYCLINE HYCLATE 100 MG CAP] 20 capsule 0     Sig: TAKE ONE CAPSULE BY MOUTH TWICE A DAY   • Testosterone Cypionate (DEPOTESTOTERONE CYPIONATE) 200 MG/ML injection [Pharmacy Med Name: TESTOSTERONE  MG/ML SDV] 6 mL      Sig: INJECT 0.5MLS INTRAMUSCULARLY EVERY 7 DAYS      Last office visit with prescribing clinician: 12/15/2022   Last telemedicine visit with prescribing clinician: 3/16/2023   Next office visit with prescribing clinician: 3/16/2023                         Would you like a call back once the refill request has been completed: [] Yes [] No    If the office needs to give you a call back, can they leave a voicemail: [] Yes [] No    Rod Shaikh MA  03/06/23, 12:55 EST

## 2024-01-10 DIAGNOSIS — N52.8 OTHER MALE ERECTILE DYSFUNCTION: ICD-10-CM

## 2024-01-10 RX ORDER — TADALAFIL 10 MG/1
5 TABLET ORAL DAILY PRN
Qty: 60 TABLET | Refills: 2 | OUTPATIENT
Start: 2024-01-10